# Patient Record
Sex: FEMALE | Race: BLACK OR AFRICAN AMERICAN | NOT HISPANIC OR LATINO | Employment: OTHER | URBAN - METROPOLITAN AREA
[De-identification: names, ages, dates, MRNs, and addresses within clinical notes are randomized per-mention and may not be internally consistent; named-entity substitution may affect disease eponyms.]

---

## 2018-03-14 ENCOUNTER — TELEPHONE (OUTPATIENT)
Dept: GASTROENTEROLOGY | Facility: CLINIC | Age: 83
End: 2018-03-14

## 2018-03-14 NOTE — TELEPHONE ENCOUNTER
----- Message from Avery Winters sent at 3/13/2018  4:42 PM CDT -----  Contact: pt   Pt is requesting a call from nurse in regards to her up coming appt on march 20, 2018  At 2:40 p.m pt states that she would like for  To look at her colonoscopy from October 6,2017 pt is trying to see if the Dr can get the records from her colonoscopy.                [phone] (home)

## 2018-03-14 NOTE — TELEPHONE ENCOUNTER
Returned call to pt who stated she had a colonoscopy in 10/2017 and her records can be obtained from [facility] before her appt on 3/20/18.

## 2018-03-20 ENCOUNTER — HOSPITAL ENCOUNTER (OUTPATIENT)
Dept: RADIOLOGY | Facility: HOSPITAL | Age: 83
Discharge: HOME | End: 2018-03-20
Attending: INTERNAL MEDICINE
Payer: MEDICARE

## 2018-03-20 ENCOUNTER — OFFICE VISIT (OUTPATIENT)
Dept: GASTROENTEROLOGY | Facility: CLINIC | Age: 83
End: 2018-03-20
Payer: MEDICARE

## 2018-03-20 VITALS
HEIGHT: 65 IN | BODY MASS INDEX: 23.11 KG/M2 | HEART RATE: 94 BPM | SYSTOLIC BLOOD PRESSURE: 168 MMHG | WEIGHT: 138.69 LBS | DIASTOLIC BLOOD PRESSURE: 82 MMHG

## 2018-03-20 DIAGNOSIS — K59.00 CONSTIPATION, UNSPECIFIED CONSTIPATION TYPE: ICD-10-CM

## 2018-03-20 DIAGNOSIS — K59.00 CONSTIPATION, UNSPECIFIED CONSTIPATION TYPE: Primary | ICD-10-CM

## 2018-03-20 PROCEDURE — 99999 PR PBB SHADOW E&M-EST. PATIENT-LVL III: CPT | Mod: PBBFAC,,, | Performed by: INTERNAL MEDICINE

## 2018-03-20 PROCEDURE — 99213 OFFICE O/P EST LOW 20 MIN: CPT | Mod: PBBFAC,25,PO | Performed by: INTERNAL MEDICINE

## 2018-03-20 PROCEDURE — 74018 RADEX ABDOMEN 1 VIEW: CPT | Mod: 26,,, | Performed by: RADIOLOGY

## 2018-03-20 PROCEDURE — 74018 RADEX ABDOMEN 1 VIEW: CPT | Mod: TC,PO

## 2018-03-20 PROCEDURE — 99204 OFFICE O/P NEW MOD 45 MIN: CPT | Mod: S$PBB,,, | Performed by: INTERNAL MEDICINE

## 2018-03-20 RX ORDER — LEVOTHYROXINE SODIUM 100 UG/1
100 TABLET ORAL DAILY
COMMUNITY

## 2018-03-20 RX ORDER — ALPRAZOLAM 0.5 MG/1
0.5 TABLET ORAL NIGHTLY PRN
COMMUNITY

## 2018-03-20 RX ORDER — TRAMADOL HYDROCHLORIDE 50 MG/1
50 TABLET ORAL DAILY PRN
COMMUNITY
Start: 2017-12-18

## 2018-03-20 NOTE — PROGRESS NOTES
Subjective:    PCP: Joe Choudhury Jr, MD    Referring physician: Aaareferral Self      Patient ID: Lu Prescott is a 89 y.o. female.    Chief Complaint: Constipation      HPI   Lu Prescott is a 89 y.o. /Black female here today for constipation.    Constipation  Patient complains of constipation. Onset was several years ago. Patient has been having rare firm and pellet like stools per week. Defecation has been difficult. Co-Morbid conditions:advanced age. Symptoms have progressed to a point and plateaued. Current Health Habits: Eating fiber? yes , Exercise? no, Adequate hydration? yes . Current over the counter/prescription laxative: stimulant Milk of magnesia which has been effective. She has tried lactulose without effect and caused bloating. She has tried Epsom salt which has been effective as well.    She had a colonoscopy at [facility] in Oct 2017 for evaluation of her symptoms which she reports as normal. Records are available from a colonoscopy in 1998 in during which one polyp was removed with hot biopsy forceps.     Denies abdominal pain, nausea, vomiting, heartburn, dysphagia, odynophagia, jaundice, hematochezia, melena, hematemesis.    Past Medical History:   Diagnosis Date    Hypertension        Past Surgical History:   Procedure Laterality Date    FOOT SURGERY      HYSTERECTOMY      TONSILLECTOMY         No family history on file.    Social History     Social History    Marital status:      Spouse name: N/A    Number of children: N/A    Years of education: N/A     Social History Main Topics    Smoking status: Never Smoker    Smokeless tobacco: Never Used    Alcohol use Not on file    Drug use: Unknown    Sexual activity: Not on file     Other Topics Concern    Not on file     Social History Narrative    No narrative on file       Review of patient's allergies indicates:   Allergen Reactions    Adhesive Rash       Current Outpatient Prescriptions   Medication  "Sig Dispense Refill    ALPRAZolam (XANAX) 0.5 MG tablet Take 0.5 mg by mouth nightly as needed for Anxiety.      amlodipine (NORVASC) 5 MG tablet Take 10 mg by mouth once daily.       levothyroxine (SYNTHROID) 100 MCG tablet Take 100 mcg by mouth once daily.      multivitamin capsule Take 1 capsule by mouth once daily.      spironolactone (ALDACTONE) 25 MG tablet Take 25 mg by mouth once daily.      gabapentin (NEURONTIN) 100 MG capsule Take 100 mg by mouth 3 (three) times daily.      traMADol (ULTRAM) 50 mg tablet Take 50 mg by mouth daily as needed.       No current facility-administered medications for this visit.        Review of Systems   Constitutional: Negative for activity change, appetite change, chills, diaphoresis, fatigue and fever.   HENT: Negative for congestion, ear pain, facial swelling, mouth sores, nosebleeds, rhinorrhea, sore throat and voice change.    Eyes: Negative for photophobia, pain, discharge, redness and visual disturbance.   Respiratory: Negative for apnea, cough, choking, chest tightness, shortness of breath and wheezing.    Cardiovascular: Negative for chest pain, palpitations and leg swelling.   Gastrointestinal:        As per HPI   Genitourinary: Negative for decreased urine volume, difficulty urinating, dysuria, frequency and hematuria.   Musculoskeletal: Negative for arthralgias, back pain, myalgias, neck pain and neck stiffness.   Skin: Negative for pallor and rash.   Neurological: Negative for tremors, seizures, syncope, weakness and headaches.   Hematological: Negative for adenopathy. Does not bruise/bleed easily.   Psychiatric/Behavioral: Negative for behavioral problems, confusion, hallucinations and sleep disturbance. The patient is not nervous/anxious.        Objective:   BP (!) 168/82   Pulse 94   Ht 5' 5" (1.651 m)   Wt 62.9 kg (138 lb 10.7 oz)   BMI 23.08 kg/m²   Wt Readings from Last 1 Encounters:   03/20/18 1500 62.9 kg (138 lb 10.7 oz)       Physical Exam "   Constitutional: She is oriented to person, place, and time. She appears well-developed and well-nourished. No distress.   HENT:   Head: Normocephalic and atraumatic.   Nose: Nose normal.   Mouth/Throat: Oropharynx is clear and moist.   Eyes: EOM are normal. Pupils are equal, round, and reactive to light. Right eye exhibits no discharge. Left eye exhibits no discharge. No scleral icterus.   Neck: Normal range of motion. Neck supple. No tracheal deviation present.   Cardiovascular: Normal rate, regular rhythm, normal heart sounds and intact distal pulses.  Exam reveals no gallop and no friction rub.    No murmur heard.  Pulmonary/Chest: Effort normal and breath sounds normal. No stridor. No respiratory distress. She has no wheezes. She has no rales. She exhibits no tenderness.   Abdominal: Soft. Bowel sounds are normal. She exhibits no distension and no mass. There is no tenderness. There is no rebound and no guarding. No hernia.   Musculoskeletal: Normal range of motion. She exhibits no edema or tenderness.   Lymphadenopathy:     She has no cervical adenopathy.   Neurological: She is alert and oriented to person, place, and time. She has normal reflexes.   Skin: Skin is warm and dry. She is not diaphoretic.   Psychiatric: She has a normal mood and affect. Her behavior is normal. Judgment and thought content normal.       No results found for: WBC, HGB, HCT, MCV, PLT    Chemistry    No results found for: NA, K, CL, CO2, BUN, CREATININE, GLU No results found for: CALCIUM, ALKPHOS, AST, ALT, BILITOT, ESTGFRAFRICA, EGFRNONAA       No results found for: APTT  No results found for: INR, PROTIME    No components found for: FER4468    No results found for: TSH  No results found for: HGBA1C    No results found for: AMYLASE  No results found for: LIPASE    No results found for: HAV, HEPAIGM, HEPBIGM, HEPBCAB, HBEAG, HEPCAB  No results found for: PPD    No results found for: OCCULTBLOOD    No results found for: IRON, TIBC,  FERRITIN, SATURATEDIRO    Assessment:      1. Constipation, unspecified constipation type         Plan:     Constipation, unspecified constipation type  -     X-Ray Abdomen AP 1 View; Future; Expected date: 03/20/2018    Discussed use of Miralax two scoops a day since she has not tried this previously and reassess symptoms in 1 month  If no improvement will try Linzess.    Follow-up in about 4 weeks (around 4/17/2018).      Peter Portillo MD

## 2019-08-14 ENCOUNTER — OFFICE VISIT (OUTPATIENT)
Dept: ORTHOPEDICS | Facility: CLINIC | Age: 84
End: 2019-08-14
Payer: MEDICARE

## 2019-08-14 VITALS
HEIGHT: 65 IN | WEIGHT: 138 LBS | BODY MASS INDEX: 22.99 KG/M2 | HEART RATE: 72 BPM | DIASTOLIC BLOOD PRESSURE: 72 MMHG | SYSTOLIC BLOOD PRESSURE: 147 MMHG

## 2019-08-14 DIAGNOSIS — G56.01 RIGHT CARPAL TUNNEL SYNDROME: Primary | ICD-10-CM

## 2019-08-14 PROCEDURE — 99999 PR PBB SHADOW E&M-EST. PATIENT-LVL III: ICD-10-PCS | Mod: PBBFAC,,, | Performed by: NURSE PRACTITIONER

## 2019-08-14 PROCEDURE — 20526 THER INJECTION CARP TUNNEL: CPT | Mod: S$PBB,RT,, | Performed by: NURSE PRACTITIONER

## 2019-08-14 PROCEDURE — 99203 PR OFFICE/OUTPT VISIT, NEW, LEVL III, 30-44 MIN: ICD-10-PCS | Mod: S$PBB,25,, | Performed by: NURSE PRACTITIONER

## 2019-08-14 PROCEDURE — 20526 THER INJECTION CARP TUNNEL: CPT | Mod: PBBFAC,PO | Performed by: NURSE PRACTITIONER

## 2019-08-14 PROCEDURE — 99203 OFFICE O/P NEW LOW 30 MIN: CPT | Mod: S$PBB,25,, | Performed by: NURSE PRACTITIONER

## 2019-08-14 PROCEDURE — 20526 CARPAL TUNNEL: R RADIOCARPAL: ICD-10-PCS | Mod: S$PBB,RT,, | Performed by: NURSE PRACTITIONER

## 2019-08-14 PROCEDURE — 99213 OFFICE O/P EST LOW 20 MIN: CPT | Mod: PBBFAC,PO,25 | Performed by: NURSE PRACTITIONER

## 2019-08-14 PROCEDURE — 99999 PR PBB SHADOW E&M-EST. PATIENT-LVL III: CPT | Mod: PBBFAC,,, | Performed by: NURSE PRACTITIONER

## 2019-08-14 RX ORDER — TRIAMCINOLONE ACETONIDE 40 MG/ML
40 INJECTION, SUSPENSION INTRA-ARTICULAR; INTRAMUSCULAR
Status: DISCONTINUED | OUTPATIENT
Start: 2019-08-14 | End: 2019-08-14 | Stop reason: HOSPADM

## 2019-08-14 RX ADMIN — TRIAMCINOLONE ACETONIDE 40 MG: 40 INJECTION, SUSPENSION INTRA-ARTICULAR; INTRAMUSCULAR at 04:08

## 2019-08-14 NOTE — PROCEDURES
Carpal Tunnel: R radiocarpal  Date/Time: 8/14/2019 4:34 PM  Performed by: ROSSY Moya  Authorized by: ROSSY Moya     Consent Done?:  Yes (Verbal)  Indications:  Pain  Timeout: Prior to procedure the correct patient, procedure, and site was verified      Location:  Wrist  Site:  R radiocarpal  Prep: Patient was prepped and draped in usual sterile fashion    Ultrasonic Guidance for needle placement: No  Needle size:  25 G  Approach:  Volar  Medications:  40 mg triamcinolone acetonide 40 mg/mL (20 mg)  Patient tolerance:  Patient tolerated the procedure well with no immediate complications

## 2019-08-14 NOTE — PROGRESS NOTES
DATE: 8/14/2019  PATIENT: Lu Prescott  REFERRING MD:   CHIEF COMPLAINT:   Chief Complaint   Patient presents with    Right Hand - Pain       HISTORY:  Lu Prescott is a 89 y.o. female  who presents for initial evaluation of her right hand carpal tunnel syndrome.  She has been previously in the care of Dr Block and has had moderate relief of symptoms with cortisone injections.  She reports she has had the symptoms for about 2 years now.  She presents today with pain 0/10 but increases after she removes her braces in the morning.  She reports numbness and tingling in her first 3 fingers.  The symptoms do not radiate to her elbow.  She is requesting repeat injection.  She is not interested in surgery, she states she is too old for surgery.  She states she does not drive to [employer].  She does wear night splints.  She is ambulating with the assistance of a rollator walker.      PAST MEDICAL/SURGICAL HISTORY:  Past Medical History:   Diagnosis Date    Hypertension      Past Surgical History:   Procedure Laterality Date    FOOT SURGERY      HYSTERECTOMY      TONSILLECTOMY         Current Medications:   Current Outpatient Medications:     ALPRAZolam (XANAX) 0.5 MG tablet, Take 0.5 mg by mouth nightly as needed for Anxiety., Disp: , Rfl:     amlodipine (NORVASC) 5 MG tablet, Take 10 mg by mouth once daily. , Disp: , Rfl:     gabapentin (NEURONTIN) 100 MG capsule, Take 100 mg by mouth 3 (three) times daily., Disp: , Rfl:     levothyroxine (SYNTHROID) 100 MCG tablet, Take 100 mcg by mouth once daily., Disp: , Rfl:     multivitamin capsule, Take 1 capsule by mouth once daily., Disp: , Rfl:     spironolactone (ALDACTONE) 25 MG tablet, Take 25 mg by mouth once daily., Disp: , Rfl:     traMADol (ULTRAM) 50 mg tablet, Take 50 mg by mouth daily as needed., Disp: , Rfl:     Family History: family history was reviewed and is noncontributory  Social History:   Social History     Socioeconomic History     "Marital status:      Spouse name: Not on file    Number of children: Not on file    Years of education: Not on file    Highest education level: Not on file   Occupational History    Not on file   Social Needs    Financial resource strain: Not on file    Food insecurity:     Worry: Not on file     Inability: Not on file    Transportation needs:     Medical: Not on file     Non-medical: Not on file   Tobacco Use    Smoking status: Never Smoker    Smokeless tobacco: Never Used   Substance and Sexual Activity    Alcohol use: Not on file    Drug use: Not on file    Sexual activity: Not on file   Lifestyle    Physical activity:     Days per week: Not on file     Minutes per session: Not on file    Stress: Not on file   Relationships    Social connections:     Talks on phone: Not on file     Gets together: Not on file     Attends Muslim service: Not on file     Active member of club or organization: Not on file     Attends meetings of clubs or organizations: Not on file     Relationship status: Not on file   Other Topics Concern    Not on file   Social History Narrative    Not on file       ROS:  Constitution: Negative for chills, fever, and sweats. Negative for unexplained weight loss.  Eyes: no redness, no discharge  Ears: no ear pain or tinnitus  Cardiovascular: Negative for chest pain, irregular heartbeat, leg swelling and palpitations.   Respiratory: Negative for cough and shortness of breath.   Gastrointestinal: Negative for abdominal pain, nausea and vomiting.   Genitourinary: Negative for bladder incontinence and dysuria.   Neurological: Negative for numbness.   Psychiatric/Behavioral: Negative for behavior changes.   Endocrine: Negative for palpitations.   Hematologic/Lymphatic: Negative for bleeding disorders.  Skin: Negative for pruritis or rash.     PHYSICAL EXAM:  Ht 5' 5" (1.651 m)   Wt 62.6 kg (138 lb)   BMI 22.96 kg/m²   Lu Prescott is a well developed, well nourished female " in no acute distress. Physical examination of the right wrist and hand evaluated the following:    Inspection, palpation and ROM of the wrist and hand  Inspection for swelling, ecchymosis, erythema, deformity and atrophy  Tenderness to palpation of the soft tissue and bony structures  Active and passive range of motion  Sensation of the wrist and hand and upper extremity  Motor strength  Special tests for trigger finger, Finkelstein, phalens, tinels, compression test  Upper extremity vascular exam (skin temp,color, capillary refill)  Inspection for pseudomotor signs    Remarkable findings included:  No edema, effusion, ecchymosis or obvious deformity  Nontender to palpation   ROM full  Strength 5/5  Positive Tinel's and positive Durkan's  Sensation intact  Skin warm, dry, intact      IMAGING:   No xray performed today    ASSESSMENT:   Right carpal tunnel syndrome    PLAN:  The nature of the diagnosis, using models and diagrams when appropriate, was explained to the patient in detail. Treatment option discussed included non-operative measures of custom orthotic, rest,  modification of activities, over the counter pain/antiinflammatory relief, physica/occupational therapy and cortisone injection.  More aggressive treatment options include referal to hand specialist.  All questions answered and the patient wishes to proceed today with a cortisone injection.  Right wrist cortisone injection performed today (see procedure documentation).  I have instructed to monitor injection site for signs and symptoms of inflammation/infection.  I have instructed to elevate and apply ice to wrist this evening.  Follow up if no improvement or worsening of symptoms.

## 2019-12-13 ENCOUNTER — TELEPHONE (OUTPATIENT)
Dept: ORTHOPEDICS | Facility: CLINIC | Age: 84
End: 2019-12-13

## 2019-12-13 NOTE — TELEPHONE ENCOUNTER
----- Message from Torie Briscoe sent at 12/13/2019  4:48 PM CST -----  Contact: PT   Type:  Sooner Apoointment Request    Caller is requesting a sooner appointment.  Caller declined first available appointment listed below.  Caller will not accept being placed on the waitlist and is requesting a message be sent to doctor.  Name of Caller: PT   When is the first available appointment? N/a   Symptoms: carpal tunnel pain in arm/knee pain in both knees   Would the patient rather a call back or a response via [facility]? Call back   Best Call Back Number:[phone] (home)   Additional Information: n/a

## 2019-12-13 NOTE — TELEPHONE ENCOUNTER
I offered pt appointment with Amina Daniels on Friday 12/20.  Pt stated she cannot come in on Friday's due to transportation issues.  I informed pt Ms Daniels has clinic only on Friday's at this time.  Pt requesting a different provider to address her bilateral knee pain.  Pt scheduled to Maritza Chao on 12/18/19.

## 2019-12-16 DIAGNOSIS — M25.561 PAIN IN BOTH KNEES, UNSPECIFIED CHRONICITY: Primary | ICD-10-CM

## 2019-12-16 DIAGNOSIS — M25.562 PAIN IN BOTH KNEES, UNSPECIFIED CHRONICITY: Primary | ICD-10-CM

## 2019-12-18 ENCOUNTER — OFFICE VISIT (OUTPATIENT)
Dept: ORTHOPEDICS | Facility: CLINIC | Age: 84
End: 2019-12-18
Payer: MEDICARE

## 2019-12-18 ENCOUNTER — HOSPITAL ENCOUNTER (OUTPATIENT)
Dept: RADIOLOGY | Facility: HOSPITAL | Age: 84
Discharge: HOME | End: 2019-12-18
Attending: NURSE PRACTITIONER
Payer: MEDICARE

## 2019-12-18 VITALS
SYSTOLIC BLOOD PRESSURE: 143 MMHG | WEIGHT: 138 LBS | BODY MASS INDEX: 22.99 KG/M2 | HEART RATE: 72 BPM | DIASTOLIC BLOOD PRESSURE: 75 MMHG | HEIGHT: 65 IN

## 2019-12-18 DIAGNOSIS — G56.01 RIGHT CARPAL TUNNEL SYNDROME: ICD-10-CM

## 2019-12-18 DIAGNOSIS — M17.0 BILATERAL PRIMARY OSTEOARTHRITIS OF KNEE: Primary | ICD-10-CM

## 2019-12-18 DIAGNOSIS — M25.561 PAIN IN BOTH KNEES, UNSPECIFIED CHRONICITY: ICD-10-CM

## 2019-12-18 DIAGNOSIS — M25.562 PAIN IN BOTH KNEES, UNSPECIFIED CHRONICITY: ICD-10-CM

## 2019-12-18 PROCEDURE — 20526 THER INJECTION CARP TUNNEL: CPT | Mod: S$PBB,RT,, | Performed by: ORTHOPAEDIC SURGERY

## 2019-12-18 PROCEDURE — 99214 OFFICE O/P EST MOD 30 MIN: CPT | Mod: S$PBB,25,, | Performed by: NURSE PRACTITIONER

## 2019-12-18 PROCEDURE — 99213 OFFICE O/P EST LOW 20 MIN: CPT | Mod: PBBFAC,25,PN | Performed by: NURSE PRACTITIONER

## 2019-12-18 PROCEDURE — 20610 LARGE JOINT ASPIRATION/INJECTION: R KNEE, L KNEE: ICD-10-PCS | Mod: 50,S$PBB,, | Performed by: NURSE PRACTITIONER

## 2019-12-18 PROCEDURE — 20610 DRAIN/INJ JOINT/BURSA W/O US: CPT | Mod: 50,S$PBB,, | Performed by: NURSE PRACTITIONER

## 2019-12-18 PROCEDURE — 20526 CARPAL TUNNEL: R RADIOCARPAL: ICD-10-PCS | Mod: S$PBB,RT,, | Performed by: ORTHOPAEDIC SURGERY

## 2019-12-18 PROCEDURE — 73562 X-RAY EXAM OF KNEE 3: CPT | Mod: 26,50,, | Performed by: RADIOLOGY

## 2019-12-18 PROCEDURE — 1159F PR MEDICATION LIST DOCUMENTED IN MEDICAL RECORD: ICD-10-PCS | Mod: ,,, | Performed by: NURSE PRACTITIONER

## 2019-12-18 PROCEDURE — 1125F PR PAIN SEVERITY QUANTIFIED, PAIN PRESENT: ICD-10-PCS | Mod: ,,, | Performed by: NURSE PRACTITIONER

## 2019-12-18 PROCEDURE — 99999 PR PBB SHADOW E&M-EST. PATIENT-LVL III: CPT | Mod: PBBFAC,,, | Performed by: NURSE PRACTITIONER

## 2019-12-18 PROCEDURE — 73562 XR KNEE ORTHO BILAT: ICD-10-PCS | Mod: 26,50,, | Performed by: RADIOLOGY

## 2019-12-18 PROCEDURE — 1125F AMNT PAIN NOTED PAIN PRSNT: CPT | Mod: ,,, | Performed by: NURSE PRACTITIONER

## 2019-12-18 PROCEDURE — 73562 X-RAY EXAM OF KNEE 3: CPT | Mod: TC,50,PO

## 2019-12-18 PROCEDURE — 20610 DRAIN/INJ JOINT/BURSA W/O US: CPT | Mod: 50,PBBFAC,PN | Performed by: NURSE PRACTITIONER

## 2019-12-18 PROCEDURE — 99999 PR PBB SHADOW E&M-EST. PATIENT-LVL III: ICD-10-PCS | Mod: PBBFAC,,, | Performed by: NURSE PRACTITIONER

## 2019-12-18 PROCEDURE — 20526 THER INJECTION CARP TUNNEL: CPT | Mod: PBBFAC,PN | Performed by: ORTHOPAEDIC SURGERY

## 2019-12-18 PROCEDURE — 99214 PR OFFICE/OUTPT VISIT, EST, LEVL IV, 30-39 MIN: ICD-10-PCS | Mod: S$PBB,25,, | Performed by: NURSE PRACTITIONER

## 2019-12-18 PROCEDURE — 1159F MED LIST DOCD IN RCRD: CPT | Mod: ,,, | Performed by: NURSE PRACTITIONER

## 2019-12-18 RX ADMIN — TRIAMCINOLONE ACETONIDE 40 MG: 40 INJECTION, SUSPENSION INTRA-ARTICULAR; INTRAMUSCULAR at 02:12

## 2019-12-18 NOTE — Clinical Note
December 19, 2019      No Recipients           [facility]  [address]  [place] [address]  Phone: [phone]          Patient: Lu Prescott   MR Number: [id number]   YOB: 1927   Date of Visit: 12/18/2019       Dear :    Thank you for referring Lu Prescott to me for evaluation. Attached you will find relevant portions of my assessment and plan of care.    If you have questions, please do not hesitate to call me. I look forward to following Lu Prescott along with you.    Sincerely,    Maritza Chao, FRED    Enclosure  CC:  No Recipients    If you would like to receive this communication electronically, please contact [email or web address] or [phone] to request more information on [employer] access.    For providers and/or their staff who would like to refer a patient to [facility], please contact us through our one-stop-shop provider referral line, [facility] , at [phone].    If you feel you have received this communication in error or would no longer like to receive these types of communications, please e-mail [email or web address]

## 2019-12-19 PROBLEM — G56.01 RIGHT CARPAL TUNNEL SYNDROME: Status: ACTIVE | Noted: 2019-12-19

## 2019-12-19 RX ORDER — TRIAMCINOLONE ACETONIDE 40 MG/ML
40 INJECTION, SUSPENSION INTRA-ARTICULAR; INTRAMUSCULAR
Status: DISCONTINUED | OUTPATIENT
Start: 2019-12-18 | End: 2019-12-19 | Stop reason: HOSPADM

## 2019-12-19 NOTE — PROCEDURES
Large Joint Aspiration/Injection: R knee, L knee  Date/Time: 12/18/2019 2:20 PM  Performed by: Maritza Chao NP  Authorized by: Maritza Chao NP     Consent Done?:  Yes (Verbal)  Indications:  Pain  Procedure site marked: Yes    Timeout: Prior to procedure the correct patient, procedure, and site was verified      Location:  Knee  Site:  R knee and L knee  Prep: Patient was prepped and draped in usual sterile fashion    Needle size:  21 G  Approach:  Anterolateral  Medications:  40 mg triamcinolone acetonide 40 mg/mL; 40 mg triamcinolone acetonide 40 mg/mL  Patient tolerance:  Patient tolerated the procedure well with no immediate complications

## 2019-12-19 NOTE — PROCEDURES
Carpal Tunnel: R radiocarpal  Date/Time: 12/18/2019 2:20 PM  Performed by: Mirza Carlos MD  Authorized by: Maritza Chao NP     Consent Done?:  Yes (Verbal)  Indications:  Pain  Site marked: The procedure site was marked    Timeout: Prior to procedure the correct patient, procedure, and site was verified      Location:  Wrist  Site:  R radiocarpal  Prep: Patient was prepped and draped in usual sterile fashion    Ultrasonic Guidance for needle placement: No  Needle size:  25 G  Approach:  Volar  Medications:  40 mg triamcinolone acetonide 40 mg/mL (20mg injected )  Patient tolerance:  Patient tolerated the procedure well with no immediate complications

## 2019-12-19 NOTE — PROGRESS NOTES
Chief Complaint   Patient presents with    Right Knee - Pain    Left Knee - Pain         HPI:   This is a 89 y.o. who presents to clinic today complaining of bilateral knee pain for years after no known trauma. Pain is progressively worsening. No numbness or tingling. Also complains of numbness and tingeing to right thumb, index and long fingers for years that improves with injection.    Past Medical History:   Diagnosis Date    Hypertension      Past Surgical History:   Procedure Laterality Date    FOOT SURGERY      HYSTERECTOMY      TONSILLECTOMY       Current Outpatient Medications on File Prior to Visit   Medication Sig Dispense Refill    ALPRAZolam (XANAX) 0.5 MG tablet Take 0.5 mg by mouth nightly as needed for Anxiety.      amLODIPine (NORVASC) 10 MG tablet Take 10 mg by mouth once daily.       levothyroxine (SYNTHROID) 100 MCG tablet Take 100 mcg by mouth once daily.      spironolactone (ALDACTONE) 25 MG tablet Take 25 mg by mouth once daily.      traMADol (ULTRAM) 50 mg tablet Take 50 mg by mouth daily as needed.      gabapentin (NEURONTIN) 100 MG capsule Take 100 mg by mouth 3 (three) times daily.      multivitamin capsule Take 1 capsule by mouth once daily.       No current facility-administered medications on file prior to visit.      Review of patient's allergies indicates:   Allergen Reactions    Adhesive Rash     History reviewed. No pertinent family history.  Social History     Socioeconomic History    Marital status:      Spouse name: Not on file    Number of children: Not on file    Years of education: Not on file    Highest education level: Not on file   Occupational History    Not on file   Social Needs    Financial resource strain: Not on file    Food insecurity:     Worry: Not on file     Inability: Not on file    Transportation needs:     Medical: Not on file     Non-medical: Not on file   Tobacco Use    Smoking status: Never Smoker    Smokeless tobacco: Never  Used   Substance and Sexual Activity    Alcohol use: Not on file    Drug use: Not on file    Sexual activity: Not on file   Lifestyle    Physical activity:     Days per week: Not on file     Minutes per session: Not on file    Stress: Not on file   Relationships    Social connections:     Talks on phone: Not on file     Gets together: Not on file     Attends [employer] service: Not on file     Active member of club or organization: Not on file     Attends meetings of clubs or organizations: Not on file     Relationship status: Not on file   Other Topics Concern    Not on file   Social History Narrative    Not on file       Review of Systems:  Constitutional:  Denies fever or chills   Eyes:  Denies change in visual acuity   HENT:  Denies nasal congestion or sore throat   Respiratory:  Denies cough or shortness of breath   Cardiovascular:  Denies chest pain or edema   GI:  Denies abdominal pain, nausea, vomiting, bloody stools or diarrhea   :  Denies dysuria   Integument:  Denies rash   Neurologic:  Denies headache, focal weakness or sensory changes   Endocrine:  Denies polyuria or polydipsia   Lymphatic:  Denies swollen glands   Psychiatric:  Denies depression or anxiety     Physical Exam:   Constitutional:  Well developed, well nourished, no acute distress, non-toxic appearance   Integument:  Well hydrated, no rash   Lymphatic:  No lymphadenopathy noted   Neurologic:  Alert & oriented x 3  Psychiatric:  Speech and behavior appropriate   Eyes: EOMI  Gi: abdomen soft    Bilateral Knee Exam      Tenderness   The patient is experiencing tenderness in the medial joint line.    Range of Motion   Extension: abnormal   Flexion: abnormal     Muscle Strength     The patient has normal knee strength.    Tests   Chanel:  Medial - positive   Lachman:  Anterior - negative      Varus: negative  Valgus: negative  Patellar Apprehension: negative    Other   Erythema: absent  Sensation: normal  Pulse: present  Swelling:  mild      Left hand  Exam performed same as contralateral side and is normal    Right hand  Skin intact. No TTP. Decreased sensation in the median distribution. Mildly decreased radial and ulnar sensation. Negative Tinel's, positive Pennie's test. She has a 2+ radial pulse.      X-rays were performed, personally reviewed by me and findings discussed with the patient.  5 views of the bilateral knee show severe tricompartmental degenerative change most pronounced in the medial compartment    Right carpal tunnel syndrome    Bilateral primary osteoarthritis of knee            Using an aseptic technique, I injected 5 cc of lidocaine 1% without and 1 cc of kenalog 40mg into the bilateral knee.  Dr. Carlos  injected 0.5 cc of lidocaine 1% without and 0.5 cc of kenalog 40mg into the right carpal tunnel. The patient tolerated this well. I will have them return to clinic as needed.